# Patient Record
Sex: FEMALE | Race: WHITE | NOT HISPANIC OR LATINO | Employment: UNEMPLOYED | URBAN - METROPOLITAN AREA
[De-identification: names, ages, dates, MRNs, and addresses within clinical notes are randomized per-mention and may not be internally consistent; named-entity substitution may affect disease eponyms.]

---

## 2022-07-13 ENCOUNTER — OFFICE VISIT (OUTPATIENT)
Dept: ORTHOPEDIC SURGERY | Facility: CLINIC | Age: 47
End: 2022-07-13

## 2022-07-13 VITALS
SYSTOLIC BLOOD PRESSURE: 122 MMHG | BODY MASS INDEX: 27.16 KG/M2 | WEIGHT: 163 LBS | DIASTOLIC BLOOD PRESSURE: 64 MMHG | HEIGHT: 65 IN

## 2022-07-13 DIAGNOSIS — M79.672 BILATERAL FOOT PAIN: Primary | ICD-10-CM

## 2022-07-13 DIAGNOSIS — M79.671 BILATERAL FOOT PAIN: Primary | ICD-10-CM

## 2022-07-13 DIAGNOSIS — I87.8 VENOUS STASIS: ICD-10-CM

## 2022-07-13 PROCEDURE — 99203 OFFICE O/P NEW LOW 30 MIN: CPT | Performed by: ORTHOPAEDIC SURGERY

## 2022-07-13 NOTE — PROGRESS NOTES
"NEW PATIENT    Patient: Odalys Ridley  : 1975    Primary Care Provider: Provider, No Known    Requesting Provider: As above    Pain of the Right Foot and Pain of the Left Foot      History    Chief Complaint: Bilateral foot pain    History of Present Illness: This is a 46-year-old woman here today with her  and son.  She is here for another opinion regarding bilateral foot pain.  She reports she has had pain for many years.  It has gotten worse over time.  She worked at [employer], but is no longer working there due to a left thumb problem.  She has seen podiatry, we do not have those records.  She reports she has had custom orthotics that did not help.  She has more pain with shoes that push on the top of the feet.  She has start up pain.  She reports its 8-10 out of 10.  She has been advised \"the bones in my feet are abnormal\".  Someone has talked to her about \"breaking all the bones in the feet\".  She has not tried topical Voltaren gel.  She has tried compression stockings.  She does have some varicosities I would recommend she wear compression stockings daily.  She had EMG, nerve conduction studies done 2022 at [facility].  I have the report and the studies.  There is no evidence of nerve entrapment or peripheral neuropathy.    No current outpatient medications on file prior to visit.     No current facility-administered medications on file prior to visit.      Allergies   Allergen Reactions   • Amoxicillin Hives      Past Medical History:   Diagnosis Date   • Anemia    • Arthritis    • Back pain    • Fallen arches    • Migraines    • Plantar fasciitis      Past Surgical History:   Procedure Laterality Date   •  SECTION  1998   •  SECTION  2001   •  SECTION  2015   • HAND SURGERY Left 2021    Thumb surgery   • HAND SURGERY  2021    Thumb surgery     Family History   Problem Relation Age of Onset   • Cancer Mother    • Arthritis Mother    • " "Diabetes Brother    • Cancer Brother    • Arthritis Brother       Social History     Socioeconomic History   • Marital status:    Tobacco Use   • Smoking status: Never Smoker   • Smokeless tobacco: Never Used   Vaping Use   • Vaping Use: Never used   Substance and Sexual Activity   • Alcohol use: Never   • Drug use: Never   • Sexual activity: Defer        Review of Systems   Constitutional: Positive for fatigue.   Eyes: Negative.    Respiratory: Negative.    Cardiovascular: Negative.    Gastrointestinal: Negative.    Endocrine: Positive for cold intolerance and heat intolerance.   Genitourinary: Negative.    Musculoskeletal: Positive for arthralgias, back pain, joint swelling, neck pain and neck stiffness.   Skin: Negative.    Allergic/Immunologic: Negative.    Neurological: Positive for weakness, light-headedness and headaches.   Hematological: Negative.    Psychiatric/Behavioral: Positive for decreased concentration and sleep disturbance. The patient is nervous/anxious.        The following portions of the patient's history were reviewed and updated as appropriate: allergies, current medications, past family history, past medical history, past social history, past surgical history and problem list.    Physical Exam:   /64   Ht 165.1 cm (65\")   Wt 73.9 kg (163 lb)   BMI 27.12 kg/m²   GENERAL: Body habitus: overweight    Lower extremity edema: Right: trace; Left: trace    Varicose veins:  Right: mild; Left: mild    Gait: antalgic     Mental Status:  awake and alert; oriented to person, place, and time    Voice:  clear  SKIN:  Lower extremity: Normal    Hair Growth(lower extremity):  Right:normal; Left:  normal  NAILS: Toenails: normal  HEENT: Head: Normocephalic, atraumatic,  without obvious abnormality.  eye: normal external eye, no icterus  ears:normal external ears  PULM:  Repiratory effort normal  CV:  Dorsalis Pedis:  Right: 2+; Left:2+    Posterior Tibial: Right:2+; Left:2+    Capillary Refill: " " Brisk  MSK:  Hand:No tremor      Tibia:  Right:  non tender; Left:  non tender      Ankle:  Right: non tender; Left:  non tender      Foot:  Right:  Diffusely tender throughout, moderate metatarsus adductus, moderate hallux valgus; Left:  Diffusely tender, similar metatarsus adductus and hallux valgus compared with the right      NEURO: Heel Walking:  Right:  painful; Left:  painful    Toe Walking:  Right:  limited ability, painful; Left:  limited ability, painful     [place]-Debbie 5.07 monofilament test: normal    Lower extremity sensation: intact       Motor Function: all 5/5, she reports pain with all motor testing lower extremities       Medical Decision Making    Data Review:   ordered and reviewed x-rays today, reviewed radiology results and reviewed outside records    Assessment and Plan/ Diagnosis/Treatment options:   1. 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  I explained metatarsus adductus to the patient and her .  I explained that the angulation of the joints can lead to early arthritis.  I would not recommend \"breaking all the bones\" because that does not change the joints.  Surgery is only a very last resort, and would involve fusing the joints.  That is not something that should be done in a patient in their 40s.  Is only very rarely indicated in the 60s and 70s.  It does not give you a normal foot.  I would recommend better custom orthotics and I gave her prescription.  I recommend topical Voltaren gel.  I also recommend a different way to lace her shoes and I showed her how to do that.  - XR Foot 2 View Bilateral    2. Venous stasis  Definitely recommend compression socks to prevent the varicosities from worsening            Part of this encounter note is an electronic transcription/translation of spoken language to printed text. The electronic translation of spoken language may permit erroneous, or at times, nonsensical " words or phrases to be inadvertently transcribed; Although I have reviewed the note for such errors, some may still exist.          Cassia Marcial MD

## 2022-08-01 ENCOUNTER — TELEPHONE (OUTPATIENT)
Dept: ORTHOPEDIC SURGERY | Facility: CLINIC | Age: 47
End: 2022-08-01

## 2022-08-01 NOTE — TELEPHONE ENCOUNTER
Caller: INES TAVARES    Relationship: PATIENT    Best call back number: [phone]    What form or medical record are you requesting:IMAGING, XRAY SHEY FOOT 07/13/22 ON DISC PLEASE     Who is requesting this form or medical record from you: PATIENT     How would you like to receive the form or medical records (pick-up, mail, fax)  MAIL     If mail, what is the address: PLEASE MAIL DISC TO [address] [place] [id number]      Timeframe paperwork needed: ASAP    Additional notes: RECEIVED OFFICE NOTES FOR THIS DAY 07/13/22 WOULD ALSO LIKE XRAY OR DISC

## 2024-02-22 ENCOUNTER — TRANSCRIBE ORDERS (OUTPATIENT)
Dept: ADMINISTRATIVE | Facility: HOSPITAL | Age: 49
End: 2024-02-22
Payer: COMMERCIAL

## 2024-02-22 DIAGNOSIS — Z12.31 ENCOUNTER FOR SCREENING MAMMOGRAM FOR MALIGNANT NEOPLASM OF BREAST: Primary | ICD-10-CM
